# Patient Record
(demographics unavailable — no encounter records)

---

## 2025-01-23 NOTE — DISCUSSION/SUMMARY
[FreeTextEntry1] : Chest pain  Patient has hypertension.  Coronary calcification in CT. Will do a coronary CT. TTE. Lipid panel  Will do carotid ultrasound in the future visit. [EKG obtained to assist in diagnosis and management of assessed problem(s)] : EKG obtained to assist in diagnosis and management of assessed problem(s)

## 2025-01-23 NOTE — HISTORY OF PRESENT ILLNESS
[FreeTextEntry1] : 67-year-old male with history of early CAD in father, no toxic habits, hypertension, hyperlipidemia, with a lot of recent stress with family, who has had multiple episodes of chest discomfort and chest pain last week which led him to go to the ER at [place] on Monday, negative troponin, EKG unremarkable, negative for PE, coronary calcifications in the lung CT, since then patient has not have any more chest pain, no shortness of breath/dyspnea on exertion/palpitation/syncope.  EKG low voltage.

## 2025-03-20 NOTE — DISCUSSION/SUMMARY
[FreeTextEntry1] : CAD/high coronary calcium score/mild to moderate LAD disease CT FFR of the distal LAD is positive. Patient is asymptomatic now. Patient is very active and has no symptoms with activity. Patient has had these on and off chest pains whenever he is very anxious for very long time in his life. I do not think that the symptoms are related to the mild to moderate LAD disease at this point. Patient has a long mass likely an infectious process following with PCP/rheumatology.  Will switch to Crestor 20 for LDL goal of 50s or 60s.  LDL now is 90s Carotid ultrasound in the future.  [EKG obtained to assist in diagnosis and management of assessed problem(s)] : EKG obtained to assist in diagnosis and management of assessed problem(s)

## 2025-03-20 NOTE — HISTORY OF PRESENT ILLNESS
[FreeTextEntry1] : 67-year-old male with history of early CAD in father, no toxic habits, hypertension, hyperlipidemia, with a lot of recent stress with family, who has had multiple episodes of chest discomfort and chest pain last week which led him to go to the ER at [place] on Monday, negative troponin, EKG unremarkable, negative for PE, coronary calcifications in the lung CT, since then patient has not have any more chest pain, no shortness of breath/dyspnea on exertion/palpitation/syncope.  EKG low voltage.  3/20/2025 patient has no chest pain/shortness of breath/dyspnea on exertion/palpitation/syncope.  EKG is Unchanged.  TTE unremarkable.  Coronary CTA with calcium score over thousand and mild to moderate proximal LAD disease with CT FFR positive mildly